# Patient Record
Sex: MALE | Race: WHITE | NOT HISPANIC OR LATINO | Employment: UNEMPLOYED | URBAN - METROPOLITAN AREA
[De-identification: names, ages, dates, MRNs, and addresses within clinical notes are randomized per-mention and may not be internally consistent; named-entity substitution may affect disease eponyms.]

---

## 2020-02-14 ENCOUNTER — TELEPHONE (OUTPATIENT)
Dept: SCHEDULING | Facility: IMAGING CENTER | Age: 25
End: 2020-02-14

## 2020-03-02 ENCOUNTER — TELEPHONE (OUTPATIENT)
Dept: MEDICAL GROUP | Facility: MEDICAL CENTER | Age: 25
End: 2020-03-02

## 2020-03-02 NOTE — TELEPHONE ENCOUNTER
Mailbox is full.  Emailed patient about no show to appointment today 3/2/20.  Explained this was his first no show and the no show policy.

## 2020-03-14 ENCOUNTER — TELEPHONE (OUTPATIENT)
Dept: SCHEDULING | Facility: IMAGING CENTER | Age: 25
End: 2020-03-14

## 2020-03-18 ENCOUNTER — TELEPHONE (OUTPATIENT)
Dept: HEALTH INFORMATION MANAGEMENT | Facility: OTHER | Age: 25
End: 2020-03-18

## 2020-03-18 NOTE — TELEPHONE ENCOUNTER
1. Caller Name: Willis                        Call Back Number: [phone]   [facility] PCP or Specialty Provider: No          2.  Does patient have any active symptoms of respiratory illness (fever OR cough OR shortness of breath)? No.    3.  Does patient have any comoribidities? None     4.  In the last 30 days, has the patient traveled outside of the country OR in a high risk area within the [place] OR have any known contact with someone who has or is suspected to have COVID-19?  No.    5. Disposition: Cleared by RN Triage; OK to keep/schedule appointment  At urgent care.    Routed FYI to Deyis Quezada PA-C

## 2020-04-14 ENCOUNTER — OFFICE VISIT (OUTPATIENT)
Dept: MEDICAL GROUP | Facility: MEDICAL CENTER | Age: 25
End: 2020-04-14
Attending: PHYSICIAN ASSISTANT
Payer: MEDICAID

## 2020-04-14 VITALS
TEMPERATURE: 98.4 F | RESPIRATION RATE: 16 BRPM | SYSTOLIC BLOOD PRESSURE: 110 MMHG | OXYGEN SATURATION: 95 % | WEIGHT: 200.5 LBS | HEART RATE: 78 BPM | HEIGHT: 68 IN | BODY MASS INDEX: 30.39 KG/M2 | DIASTOLIC BLOOD PRESSURE: 60 MMHG

## 2020-04-14 DIAGNOSIS — F32.A ANXIETY AND DEPRESSION: ICD-10-CM

## 2020-04-14 DIAGNOSIS — Z91.09 ENVIRONMENTAL ALLERGIES: ICD-10-CM

## 2020-04-14 DIAGNOSIS — Z87.898 HISTORY OF VOMITING: ICD-10-CM

## 2020-04-14 DIAGNOSIS — F41.9 ANXIETY AND DEPRESSION: ICD-10-CM

## 2020-04-14 DIAGNOSIS — Z23 NEED FOR VACCINATION: ICD-10-CM

## 2020-04-14 PROCEDURE — 99204 OFFICE O/P NEW MOD 45 MIN: CPT | Performed by: PHYSICIAN ASSISTANT

## 2020-04-14 PROCEDURE — 99213 OFFICE O/P EST LOW 20 MIN: CPT | Performed by: PHYSICIAN ASSISTANT

## 2020-04-14 PROCEDURE — 90715 TDAP VACCINE 7 YRS/> IM: CPT

## 2020-04-14 PROCEDURE — 90732 PPSV23 VACC 2 YRS+ SUBQ/IM: CPT

## 2020-04-14 ASSESSMENT — ENCOUNTER SYMPTOMS
PALPITATIONS: 0
DEPRESSION: 1
PHOTOPHOBIA: 0
VOMITING: 1
WEIGHT LOSS: 0
WHEEZING: 0
NAUSEA: 1
SHORTNESS OF BREATH: 0
COUGH: 0
DIARRHEA: 0
WEAKNESS: 0
EYE PAIN: 0
FEVER: 0
BLOOD IN STOOL: 0
CLAUDICATION: 0
DOUBLE VISION: 0
HEADACHES: 0
CHILLS: 1
BLURRED VISION: 0
NERVOUS/ANXIOUS: 1
DIZZINESS: 0
TINGLING: 0
SINUS PAIN: 0
CONSTIPATION: 1
SORE THROAT: 0

## 2020-04-14 ASSESSMENT — PATIENT HEALTH QUESTIONNAIRE - PHQ9: CLINICAL INTERPRETATION OF PHQ2 SCORE: 0

## 2020-04-14 NOTE — PROGRESS NOTES
Chief Complaint   Patient presents with   • Establish Care   • Requesting Labs       Subjective:     HPI:   Willis Garvey is a 24 y.o. male here to establish care and to discuss the evaluation and management of:    History of vomiting  Since being at a consistent dose of the Methadone at 85 mg, goes to clinic once a day. [facility]. Reports vomiting with an increase in Methadone and previously before that when he was coming off of opioids. He reports that he has not thrown up for 2-3 weeks now. He would throw up when he woke up first thing in the morning and when he ate a big meal. Typically 3 x a day.     No blood in the vomit ever, no difficulty swallowing/chewing, no abdominal pain.     Anxiety and depression  Has improved since starting methadone. But is having difficulty with sleeping which causes him anxiety. Also stressors of life and not having a a job are not helping. He has a counselor who he sees one on one at the methadone clinic every two weeks. He is hoping that once covid is over he is supposed to continue with therapy more often and attend group therapy. He reports that counseling has been beneficial. Currently well controlled without medication.       ROS  Review of Systems   Constitutional: Positive for chills. Negative for fever, malaise/fatigue and weight loss.   HENT: Negative for congestion, ear pain, sinus pain and sore throat.    Eyes: Negative for blurred vision, double vision, photophobia and pain.   Respiratory: Negative for cough, shortness of breath and wheezing.    Cardiovascular: Negative for chest pain, palpitations, claudication and leg swelling.   Gastrointestinal: Positive for constipation, nausea and vomiting. Negative for blood in stool, diarrhea and melena.   Genitourinary: Negative for dysuria, frequency, hematuria and urgency.   Neurological: Negative for dizziness, tingling, weakness and headaches.   Endo/Heme/Allergies: Positive for environmental allergies.  "  Psychiatric/Behavioral: Positive for depression. The patient is nervous/anxious.        No Known Allergies    Current medicines (including changes today)  No current outpatient medications on file.     No current facility-administered medications for this visit.      He  has a past medical history of Anxiety and Depression.  He  has no past surgical history on file.  Social History     Tobacco Use   • Smoking status: Current Every Day Smoker     Packs/day: 0.50     Years: 2.00     Pack years: 1.00     Types: Cigarettes   • Smokeless tobacco: Never Used   Substance Use Topics   • Alcohol use: Not Currently   • Drug use: Yes     Types: Marijuana     Comment: regularly -1 gram, 5-6 days.        Family History   Problem Relation Age of Onset   • Psychiatric Illness Mother         Bipolar      No family status information on file.       Patient Active Problem List    Diagnosis Date Noted   • History of vomiting 04/14/2020   • Anxiety and depression 04/14/2020   • Environmental allergies 04/14/2020        Objective:     /60 (BP Location: Left arm, Patient Position: Sitting, BP Cuff Size: Adult)   Pulse 78   Temp 36.9 °C (98.4 °F) (Temporal)   Resp 16   Ht 1.727 m (5' 8\")   Wt 90.9 kg (200 lb 8 oz)   SpO2 95%  Body mass index is 30.49 kg/m².    Physical Exam:  Physical Exam   Constitutional: He is oriented to person, place, and time and well-developed, well-nourished, and in no distress.   HENT:   Head: Normocephalic.   Right Ear: External ear normal.   Left Ear: External ear normal.   Nose: Mucosal edema present.   Mouth/Throat: Posterior oropharyngeal erythema present. No oropharyngeal exudate.   Eyes: Pupils are equal, round, and reactive to light.   Neck: Normal range of motion. No thyromegaly present.   Cardiovascular: Normal rate, regular rhythm and normal heart sounds.   Pulmonary/Chest: Effort normal and breath sounds normal.   Abdominal: Soft. Bowel sounds are normal.   Musculoskeletal: Normal range " of motion.   Lymphadenopathy:     He has no cervical adenopathy.        Right: No supraclavicular adenopathy present.        Left: No supraclavicular adenopathy present.   Neurological: He is alert and oriented to person, place, and time. Gait normal.   Skin: Skin is warm and dry.   Psychiatric: Affect and judgment normal.   Vitals reviewed.       Assessment and Plan:     The following treatment plan was discussed:    1. History of vomiting  - This is a chronic stable condition. No episodes of vomiting for 3 weeks now.   - Hx of opioid substance abuse currently in remission for 1.5 months now and was just recently titrated and stable at 85 mg of Methadone managed on a daily basis by [facility].   - Vomiting likely due to increase in dosage of Methadone.   - No concerning signs on examination.   - Suggested cutting back on Marijuana use.   - Will continue to monitor this.     2. Anxiety and depression  - This is a chronic, stable condition.    - No indication for pharmacotherapy at this time. Especially since he is currently being treated with Methadone.   - Continue with one on one and group counseling sessions at [facility].  - Will continue to monitor this.     3. Environmental allergies  - This is an acute condition.   - Mild erythema of the nasal mucosa and posterior oropharynx as well as mild edema of the nasal turbinates noted on exam.   - Begin using combination of Flonase and Claritin daily.   - Educated on use of the medications and importance of daily use.   - Use a cool mist humidifier in the bedroom while sleeping at night.   - Will continue to monitor this. F/u if symptoms persist or worsen.     4. Need for vaccination  - PneumoVax PPV23 =>1yo  - Tdap =>8yo IM       Any change or worsening of signs or symptoms, patient encouraged to follow-up or report to emergency room for further evaluation. Patient verbalizes understanding and agrees.    Follow-Up: Return if symptoms worsen or  fail to improve.      PLEASE NOTE: This dictation was created using voice recognition software. I have made every reasonable attempt to correct obvious errors, but I expect that there are errors of grammar and possibly content that I did not discover before finalizing the note.

## 2020-04-14 NOTE — PATIENT INSTRUCTIONS
- Flonase nasal spray use daily in order for it to work.   - Take Claritin or Zyrtec once daily.  - Use cool mist humidifier in bedroom while sleeping.

## 2020-04-14 NOTE — ASSESSMENT & PLAN NOTE
Has improved since starting methadone. But is having difficulty with sleeping which causes him anxiety. Also stressors of life and not having a a job are not helping. He has a counselor who he sees one on one at the methadone clinic every two weeks. He is hoping that once covid is over he is supposed to continue with therapy more often and attend group therapy. He reports that counseling has been beneficial. Currently well controlled without medication.

## 2020-04-14 NOTE — ASSESSMENT & PLAN NOTE
Since being at a consistent dose of the Methadone at 85 mg, goes to clinic once a day. [facility]. Reports vomiting with an increase in Methadone and previously before that when he was coming off of opioids. He reports that he has not thrown up for 2-3 weeks now. He would throw up when he woke up first thing in the morning and when he ate a big meal. Typically 3 x a day.     No blood in the vomit ever, no difficulty swallowing/chewing, no abdominal pain.

## 2020-09-10 ENCOUNTER — NURSE TRIAGE (OUTPATIENT)
Dept: HEALTH INFORMATION MANAGEMENT | Facility: OTHER | Age: 25
End: 2020-09-10

## 2020-09-10 NOTE — TELEPHONE ENCOUNTER
Regarding: covid clearance   ----- Message from Amanda Merlino sent at 9/10/2020  2:32 PM PDT -----  Nausea/ intermittent vomiting/   Off and on

## 2020-09-10 NOTE — TELEPHONE ENCOUNTER
Pt with 5-6 months of intermittent nausea and vomiting. Has had 3 covid tests all Negative. Pt states it is worse in the mornings and after me takes a methadone tablet.    1. Caller Name: Willis Garvey      Call Back Number: [phone] (home)     [facility] PCP or Specialty Provider: Yes Beekman        2.  In the last two weeks, has the patient had any new or worsening symptoms (not explained by alternative diagnosis)? Yes, the patient reports the following COVID-19 consistent symptoms: nausea and vomiting.    3.  Does patient have any comoribidities? None     4.  Has the patient traveled in the last 14 days OR had any known contact with someone who is suspected or confirmed to have COVID-19?  No.    5. Disposition: Offered patient virtual visit to limit potential exposure to others; patient also given self care instructions    Note routed to [facility] Provider: [facility] only.      Reason for Disposition  • MILD to MODERATE vomiting (e.g., 1-5 times/day) and lasts > 48 hours (2 days)    Additional Information  • Negative: Shock suspected (e.g., cold/pale/clammy skin, too weak to stand, low BP, rapid pulse)  • Negative: Difficult to awaken or acting confused (e.g., disoriented, slurred speech)  • Negative: Sounds like a life-threatening emergency to the triager  • Negative: Vomiting occurs only while coughing  • Negative: Pregnant < 20 Weeks and nausea/vomiting began in early pregnancy (i.e., 4-8 weeks pregnant)  • Negative: Chest pain  • Negative: Headache is main symptom  • Negative: SEVERE vomiting (e.g., 6 or more times/day)  • Negative: MODERATE vomiting (e.g., 3 - 5 times/day) and age > 60  • Negative: Vomiting contains bile (green color)  • Negative: Vomiting red blood or black (coffee ground) material  • Negative: Insulin-dependent diabetes and glucose > 240 mg/dL (13 mmol/L)  • Negative: Recent head injury (within 3 days)  • Negative: Recent abdominal injury (within 7 days)  • Negative: Drinking very little and has  "signs of dehydration (e.g., no urine > 12 hours, very dry mouth, very lightheaded)  • Negative: Constant abdominal pain lasting > 2 hours  • Negative: High-risk adult (e.g., brain tumor, V-P shunt, hernia)  • Negative: Severe pain in one eye  • Negative: Patient sounds very sick or weak to the triager    Answer Assessment - Initial Assessment Questions  1. VOMITING SEVERITY: \"How many times have you vomited in the past 24 hours?\"      - MILD:  1 - 2 times/day     - MODERATE: 3 - 5 times/day, decreased oral intake without significant weight loss or symptoms of dehydration     - SEVERE: 6 or more times/day, vomits everything or nearly everything, with significant weight loss, symptoms of dehydration       Moderate  2. ONSET: \"When did the vomiting begin?\"       Again, this morning. Has been going on intermittently for 5-7 months  3. FLUIDS: \"What fluids or food have you vomited up today?\" \"Have you been able to keep any fluids down?\"      Bile and phlegm  4. ABDOMINAL PAIN: \"Are your having any abdominal pain?\" If yes : \"How bad is it and what does it feel like?\" (e.g., crampy, dull, intermittent, constant)       none  5. DIARRHEA: \"Is there any diarrhea?\" If so, ask: \"How many times today?\"       non  6. CONTACTS: \"Is there anyone else in the family with the same symptoms?\"       none  7. CAUSE: \"What do you think is causing your vomiting?\"      Unknown. Happens in the morning and after pt takes his methadone.  8. HYDRATION STATUS: \"Any signs of dehydration?\" (e.g., dry mouth [not only dry lips], too weak to stand) \"When did you last urinate?\"      Not drinking today. Last urinated this AM  9. OTHER SYMPTOMS: \"Do you have any other symptoms?\" (e.g., fever, headache, vertigo, vomiting blood or coffee grounds, recent head injury)      none  10. PREGNANCY: \"Is there any chance you are pregnant?\" \"When was your last menstrual period?\"        n/a    Protocols used: VOMITING-A-OH    "

## 2020-09-11 ENCOUNTER — TELEMEDICINE (OUTPATIENT)
Dept: MEDICAL GROUP | Facility: MEDICAL CENTER | Age: 25
End: 2020-09-11
Attending: PHYSICIAN ASSISTANT
Payer: MEDICAID

## 2020-09-11 VITALS — BODY MASS INDEX: 30.31 KG/M2 | HEIGHT: 68 IN | WEIGHT: 200 LBS

## 2020-09-11 DIAGNOSIS — R11.2 NON-INTRACTABLE VOMITING WITH NAUSEA, UNSPECIFIED VOMITING TYPE: ICD-10-CM

## 2020-09-11 PROBLEM — R11.10 NON-INTRACTABLE VOMITING: Status: ACTIVE | Noted: 2020-09-11

## 2020-09-11 PROCEDURE — 99214 OFFICE O/P EST MOD 30 MIN: CPT | Mod: CR | Performed by: PHYSICIAN ASSISTANT

## 2020-09-11 RX ORDER — ONDANSETRON 4 MG/1
4 TABLET, FILM COATED ORAL EVERY 4 HOURS PRN
Qty: 30 TAB | Refills: 1 | Status: SHIPPED | OUTPATIENT
Start: 2020-09-11 | End: 2020-10-11

## 2020-09-11 NOTE — PROGRESS NOTES
"Virtual Visit: Established Patient   This visit was conducted via Zoom using secure and encrypted videoconferencing technology. The patient was in a private location in the state of [place].    The patient's identity was confirmed and verbal consent was obtained for this virtual visit.    Subjective:   CC:   Chief Complaint   Patient presents with   • Emesis       Willis Garvey is a 24 y.o. male presenting for evaluation and management of:    Non-intractable vomiting with nausea, unspecified type  23 yo male who presents with chronic nausea, vomiting and occasional mid abdominal pain. He is currently on Methadone 90 mg which is being managed by the [facility]. He reports no association of the N/V with taking this medication as this was present prior to starting the Methadone. He reports that the N/V has gotten worse since he was last seen in clinic. He states that it will \"come in spurts\". Some weeks he's fine and others he is constantly experiencing nausea and vomiting.  He reports at one point he thought it was dairy in his diet so he discontinued dairy altogether.  He reports that he did well for a few weeks but then in the following week the nausea and vomiting picked back up again.  He does note that occasionally his vomit is \"white and foamy\".  He has also had one case where the vomit had brown streaking in it that he believes is from drinking soda as he has not experienced that since.  He reports associated loss of appetite and occasional constipation since starting methadone.  He otherwise denies hematemesis, fevers, chills, hematochezia, melena, dysphagia, dyspepsia or bloating.  Treatments tried include Pepto-Bismol which he feels has been helpful.  Reports a history of stomach ulcers.  No GI alarm signs or symptoms.       ROS  + N/V, + abdominal pain, + constipation. Denies any recent fevers or chills. No chest pains or shortness of breath.     No Known Allergies    Current medicines (including " "changes today)  Current Outpatient Medications   Medication Sig Dispense Refill   • ondansetron (ZOFRAN) 4 MG Tab tablet Take 1 Tab by mouth every four hours as needed for Nausea/Vomiting for up to 30 days. 30 Tab 1     No current facility-administered medications for this visit.        Patient Active Problem List    Diagnosis Date Noted   • Non-intractable vomiting 09/11/2020   • History of vomiting 04/14/2020   • Anxiety and depression 04/14/2020   • Environmental allergies 04/14/2020       Family History   Problem Relation Age of Onset   • Psychiatric Illness Mother         Bipolar        He  has a past medical history of Anxiety and Depression.  He  has no past surgical history on file.       Objective:   Ht 1.727 m (5' 8\")   Wt 90.7 kg (200 lb) Comment: pt reported  BMI 30.41 kg/m²     Physical Exam:  Constitutional: Alert, no distress, well-groomed.  Skin: No rashes in visible areas.  Eye: Round. Conjunctiva clear, lids normal. No icterus.   ENMT: Lips pink without lesions, good dentition, moist mucous membranes. Phonation normal.  Neck: No masses, no thyromegaly. Moves freely without pain.  Respiratory: Unlabored respiratory effort, no cough or audible wheeze  Psych: Alert and oriented x3, normal affect and mood.       Assessment and Plan:   The following treatment plan was discussed:     1. Non-intractable vomiting with nausea, unspecified vomiting type  - This is a chronic condition that is worsening.  - No GI alarm signs or symptoms present.  - Unable to perform a thorough abdominal exam due to virtual visit.  - Plan: We will get labs and general abdominal ultrasound for further evaluation.  - CBC WITH DIFFERENTIAL; Future  - Comp Metabolic Panel; Future  - TSH WITH REFLEX TO FT4; Future  - HEPATITIS PANEL ACUTE(4 COMPONENTS); Future  - URINALYSIS,CULTURE IF INDICATED; Future  - HIV AG/AB COMBO ASSAY SCREENING; Future  - US-ABDOMEN COMPLETE SURVEY; Future  - Ondansetron (ZOFRAN) 4 MG Tab tablet; Take 1 " Tab by mouth every four hours as needed for Nausea/Vomiting for up to 30 days.  Dispense: 30 Tab; Refill: 1  - Encouraged patient to stay adequately hydrated.  - I will notify the patient via MyChart once I have received his lab and imaging results.    Follow-up: Return if symptoms worsen or fail to improve.

## 2021-06-22 ENCOUNTER — TELEPHONE (OUTPATIENT)
Dept: MEDICAL GROUP | Facility: MEDICAL CENTER | Age: 26
End: 2021-06-22

## 2021-06-24 ENCOUNTER — OFFICE VISIT (OUTPATIENT)
Dept: MEDICAL GROUP | Facility: MEDICAL CENTER | Age: 26
End: 2021-06-24
Attending: PHYSICIAN ASSISTANT
Payer: MEDICAID

## 2021-06-24 VITALS
HEIGHT: 70 IN | HEART RATE: 83 BPM | DIASTOLIC BLOOD PRESSURE: 78 MMHG | TEMPERATURE: 95.6 F | BODY MASS INDEX: 26.64 KG/M2 | SYSTOLIC BLOOD PRESSURE: 120 MMHG | OXYGEN SATURATION: 97 % | WEIGHT: 186.1 LBS | RESPIRATION RATE: 18 BRPM

## 2021-06-24 DIAGNOSIS — F32.A ANXIETY AND DEPRESSION: ICD-10-CM

## 2021-06-24 DIAGNOSIS — F41.9 ANXIETY AND DEPRESSION: ICD-10-CM

## 2021-06-24 DIAGNOSIS — J03.90 TONSILLITIS: ICD-10-CM

## 2021-06-24 PROCEDURE — 99214 OFFICE O/P EST MOD 30 MIN: CPT | Performed by: PHYSICIAN ASSISTANT

## 2021-06-24 PROCEDURE — 99213 OFFICE O/P EST LOW 20 MIN: CPT | Performed by: PHYSICIAN ASSISTANT

## 2021-06-24 RX ORDER — AMOXICILLIN 500 MG/1
1000 CAPSULE ORAL DAILY
Qty: 20 CAPSULE | Refills: 0 | Status: SHIPPED | OUTPATIENT
Start: 2021-06-24 | End: 2021-07-04

## 2021-06-24 RX ORDER — AMOXICILLIN 500 MG/1
1000 CAPSULE ORAL DAILY
Qty: 20 CAPSULE | Refills: 0 | Status: SHIPPED | OUTPATIENT
Start: 2021-06-24 | End: 2021-06-24

## 2021-06-24 ASSESSMENT — PATIENT HEALTH QUESTIONNAIRE - PHQ9
7. TROUBLE CONCENTRATING ON THINGS, SUCH AS READING THE NEWSPAPER OR WATCHING TELEVISION: NOT AT ALL
4. FEELING TIRED OR HAVING LITTLE ENERGY: NOT AT ALL
6. FEELING BAD ABOUT YOURSELF - OR THAT YOU ARE A FAILURE OR HAVE LET YOURSELF OR YOUR FAMILY DOWN: NOT AL ALL
3. TROUBLE FALLING OR STAYING ASLEEP OR SLEEPING TOO MUCH: NOT AT ALL
SUM OF ALL RESPONSES TO PHQ9 QUESTIONS 1 AND 2: 0
SUM OF ALL RESPONSES TO PHQ QUESTIONS 1-9: 0
8. MOVING OR SPEAKING SO SLOWLY THAT OTHER PEOPLE COULD HAVE NOTICED. OR THE OPPOSITE, BEING SO FIGETY OR RESTLESS THAT YOU HAVE BEEN MOVING AROUND A LOT MORE THAN USUAL: NOT AT ALL
2. FEELING DOWN, DEPRESSED, IRRITABLE, OR HOPELESS: NOT AT ALL
5. POOR APPETITE OR OVEREATING: NOT AT ALL
1. LITTLE INTEREST OR PLEASURE IN DOING THINGS: NOT AT ALL
9. THOUGHTS THAT YOU WOULD BE BETTER OFF DEAD, OR OF HURTING YOURSELF: NOT AT ALL

## 2021-06-24 NOTE — PROGRESS NOTES
"Chief Complaint   Patient presents with   • Depression   • Anxiety   • Pharyngitis     Subjective:     HPI:   Willis Garvey is a 25 y.o. male here to discuss the evaluation and management of:    Anxiety and depression    Depression  24 yo male who presents today for evaluation of depressed mood and/or loss of interests/pleasure most days.  There is no imminent risk of serious self-harm/suicide. No medications, comorbid medical conditions or substance abuse causing depression. Instead, he reports that in the past few months he has lost his job and lost his dog and mom to cancer and this has put him in a really hard place mentally. PHQ-9 performed in clinic today revealed a score of 14 indication moderate depression.  Denies hx of treatment resistant depression, current alvin, hypomania or psychosis. No personal or FMHx of bipolar disorder.      Anxiety  24 yo male who presents today for evaluation of excessive anxiety and worry that has occurred more often than not.. Patient finds it difficult to control the worry and  is easily fatigued, difficulty concentrating or mind going blank, restlessness or feeling keyed up or on edge, irritability, muscle tension and sleep disturbances. This has affected his social/occupational areas of functioning. PIPPA 7 performed in clinic today with a score of 12 indicating moderate anxiety. No imminent risk of serious self-harm/suicide. No current substance abuse. No hx of known thyroid disorder or hx of psychiatric conditions.     Tonsillitis  Onset/DAMON: 1 week ago, it started to feel better and now symptoms are returning starting 1 day ago.   Location: sore throat, fatigue, phlegm and chills.  Duration: \"Constant.\"  Character: Sore/painful.  Aggravating factors: swallowing.   Alleviating factors: None.   Radiation: None.   Treatments tried: None.   No red flag signs.     ROS  See HPI.     No Known Allergies    Current medicines (including changes today)  Current Outpatient Medications " "  Medication Sig Dispense Refill   • amoxicillin (AMOXIL) 500 MG Cap Take 2 Capsules by mouth every day for 10 days. 20 capsule 0     No current facility-administered medications for this visit.       Social History     Tobacco Use   • Smoking status: Current Every Day Smoker     Packs/day: 0.50     Years: 2.00     Pack years: 1.00     Types: Cigarettes   • Smokeless tobacco: Never Used   Vaping Use   • Vaping Use: Former   Substance Use Topics   • Alcohol use: Not Currently   • Drug use: Yes     Types: Marijuana     Comment: regularly -1 gram, 5-6 days.        Patient Active Problem List    Diagnosis Date Noted   • Tonsillitis 06/24/2021   • Non-intractable vomiting 09/11/2020   • History of vomiting 04/14/2020   • Anxiety and depression 04/14/2020   • Environmental allergies 04/14/2020       Family History   Problem Relation Age of Onset   • Psychiatric Illness Mother         Bipolar         Objective:     /78 (BP Location: Left arm, Patient Position: Sitting, BP Cuff Size: Adult)   Pulse 83   Temp (!) 35.3 °C (95.6 °F) (Temporal)   Resp 18   Ht 1.778 m (5' 10\")   Wt 84.4 kg (186 lb 1.6 oz)   SpO2 97%  Body mass index is 26.7 kg/m².    Physical Exam:  Physical Exam  Vitals reviewed.   Constitutional:       Appearance: Normal appearance.   HENT:      Head: Normocephalic and atraumatic.      Right Ear: External ear normal.      Left Ear: External ear normal.      Mouth/Throat:      Pharynx: Pharyngeal swelling present.      Tonsils: Tonsillar exudate present.   Eyes:      Pupils: Pupils are equal, round, and reactive to light.   Neck:      Thyroid: No thyromegaly.   Cardiovascular:      Rate and Rhythm: Normal rate and regular rhythm.      Heart sounds: Normal heart sounds.   Pulmonary:      Effort: Pulmonary effort is normal.      Breath sounds: Normal breath sounds.   Musculoskeletal:         General: Normal range of motion.      Cervical back: Normal range of motion.   Skin:     General: Skin is warm " and dry.   Neurological:      Mental Status: He is alert and oriented to person, place, and time.      Gait: Gait is intact.   Psychiatric:         Mood and Affect: Affect normal.         Speech: Speech normal.         Behavior: Behavior normal. Behavior is cooperative.         Thought Content: Thought content does not include homicidal or suicidal ideation.         Judgment: Judgment normal.       Assessment and Plan:     The following treatment plan was discussed:    1. Anxiety and depression  - This is a chronic condition.   - No imminent risk of suicidal or homicidal ideation.   - PHQ-9 performed in clinic today revealed a score of 14 indicating moderate depression and a PIPPA-7 score of 12 indicating moderate anxiety.  - The patient would benefit greatly from combination therapy with a medication and CBT.    - Plan: Referral placed to psychology for CBT and psychiatry for medication evaluation and management.   - Follow up in 4 weeks.  - REFERRAL TO PSYCHOLOGY  - REFERRAL TO PSYCHIATRY    2. Tonsillitis  - This is an acute persistent condition.  - Plan: Course of antibiotics sent to the pharmacy. Stay adequately hydrated and rest.   - amoxicillin (AMOXIL) 500 MG Cap; Take 2 Capsules by mouth every day for 10 days.  Dispense: 20 capsule; Refill: 0       Any change or worsening of signs or symptoms, patient encouraged to follow-up or report to emergency room for further evaluation. Patient verbalizes understanding and agrees.    Follow-Up: Return in about 4 weeks (around 7/22/2021).      PLEASE NOTE: This dictation was created using voice recognition software. I have made every reasonable attempt to correct obvious errors, but I expect that there are errors of grammar and possibly content that I did not discover before finalizing the note.

## 2021-06-24 NOTE — ASSESSMENT & PLAN NOTE
Depression  26 yo male who presents today for evaluation of depressed mood and/or loss of interests/pleasure most days.  There is no imminent risk of serious self-harm/suicide. No medications, comorbid medical conditions or substance abuse causing depression. Instead, he reports that in the past few months he has lost his job and lost his dog and mom to cancer and this has put him in a really hard place mentally. PHQ-9 performed in clinic today revealed a score of 14 indication moderate depression.  Denies hx of treatment resistant depression, current alvin, hypomania or psychosis. No personal or FMHx of bipolar disorder.      Anxiety  26 yo male who presents today for evaluation of excessive anxiety and worry that has occurred more often than not.. Patient finds it difficult to control the worry and  is easily fatigued, difficulty concentrating or mind going blank, restlessness or feeling keyed up or on edge, irritability, muscle tension and sleep disturbances. This has affected his social/occupational areas of functioning. PIPPA 7 performed in clinic today with a score of 12 indicating moderate anxiety. No imminent risk of serious self-harm/suicide. No current substance abuse. No hx of known thyroid disorder or hx of psychiatric conditions.

## 2021-06-24 NOTE — ASSESSMENT & PLAN NOTE
"Onset/DAMON: 1 week ago, it started to feel better and now symptoms are returning starting 1 day ago.   Location: sore throat, fatigue, phlegm and chills.  Duration: \"Constant.\"  Character: Sore/painful.  Aggravating factors: swallowing.   Alleviating factors: None.   Radiation: None.   Treatments tried: None.   No red flag signs.     "